# Patient Record
Sex: FEMALE | Race: WHITE
[De-identification: names, ages, dates, MRNs, and addresses within clinical notes are randomized per-mention and may not be internally consistent; named-entity substitution may affect disease eponyms.]

---

## 2019-03-19 ENCOUNTER — HOSPITAL ENCOUNTER (EMERGENCY)
Dept: HOSPITAL 14 - H.ER | Age: 24
Discharge: HOME | End: 2019-03-19
Payer: COMMERCIAL

## 2019-03-19 VITALS
HEART RATE: 64 BPM | RESPIRATION RATE: 19 BRPM | OXYGEN SATURATION: 100 % | TEMPERATURE: 98 F | DIASTOLIC BLOOD PRESSURE: 65 MMHG | SYSTOLIC BLOOD PRESSURE: 110 MMHG

## 2019-03-19 VITALS — BODY MASS INDEX: 23.5 KG/M2

## 2019-03-19 DIAGNOSIS — R42: Primary | ICD-10-CM

## 2019-03-19 LAB
ALBUMIN SERPL-MCNC: 3.8 G/DL (ref 3.5–5)
ALBUMIN/GLOB SERPL: 1.2 {RATIO} (ref 1–2.1)
ALT SERPL-CCNC: 22 U/L (ref 9–52)
AST SERPL-CCNC: 21 U/L (ref 14–36)
BASOPHILS # BLD AUTO: 0.1 K/UL (ref 0–0.2)
BASOPHILS NFR BLD: 2 % (ref 0–2)
BUN SERPL-MCNC: 9 MG/DL (ref 7–17)
CALCIUM SERPL-MCNC: 8.9 MG/DL (ref 8.4–10.2)
EOSINOPHIL # BLD AUTO: 0.2 K/UL (ref 0–0.7)
EOSINOPHIL NFR BLD: 6.7 % (ref 0–4)
ERYTHROCYTE [DISTWIDTH] IN BLOOD BY AUTOMATED COUNT: 12.9 % (ref 11.5–14.5)
GFR NON-AFRICAN AMERICAN: > 60
HGB BLD-MCNC: 12.2 G/DL (ref 12–16)
LYMPHOCYTES # BLD AUTO: 1 K/UL (ref 1–4.3)
LYMPHOCYTES NFR BLD AUTO: 28.5 % (ref 20–40)
MCH RBC QN AUTO: 30.9 PG (ref 27–31)
MCHC RBC AUTO-ENTMCNC: 33.6 G/DL (ref 33–37)
MCV RBC AUTO: 92.1 FL (ref 81–99)
MONOCYTES # BLD: 0.4 K/UL (ref 0–0.8)
MONOCYTES NFR BLD: 10.1 % (ref 0–10)
NEUTROPHILS # BLD: 1.9 K/UL (ref 1.8–7)
NEUTROPHILS NFR BLD AUTO: 52.7 % (ref 50–75)
NRBC BLD AUTO-RTO: 0.1 % (ref 0–0)
PLATELET # BLD: 185 K/UL (ref 130–400)
PMV BLD AUTO: 10 FL (ref 7.2–11.7)
RBC # BLD AUTO: 3.93 MIL/UL (ref 3.8–5.2)
WBC # BLD AUTO: 3.7 K/UL (ref 4.8–10.8)

## 2019-03-19 PROCEDURE — 81025 URINE PREGNANCY TEST: CPT

## 2019-03-19 PROCEDURE — 85025 COMPLETE CBC W/AUTO DIFF WBC: CPT

## 2019-03-19 PROCEDURE — 93005 ELECTROCARDIOGRAM TRACING: CPT

## 2019-03-19 PROCEDURE — 80053 COMPREHEN METABOLIC PANEL: CPT

## 2019-03-19 PROCEDURE — 70450 CT HEAD/BRAIN W/O DYE: CPT

## 2019-03-19 PROCEDURE — 99285 EMERGENCY DEPT VISIT HI MDM: CPT

## 2019-03-19 NOTE — CARD
--------------- APPROVED REPORT --------------





Date of service: 03/19/2019



EKG Measurement

Heart [id number]

[id number]

[id number]

[id number]

[id number]



<Conclusion>

Sinus bradycardia with sinus arrhythmia

Otherwise normal ECG

## 2019-03-19 NOTE — CT
Date of service: 03/19/2019



PROCEDURE:  CT HEAD WITHOUT CONTRAST.



HISTORY:

dizzy



COMPARISON:

Noncontrast head CT performed 7/2/14



TECHNIQUE:

Axial computed tomography images were obtained through the head/brain 

without intravenous contrast.  



Radiation dose:



Total exam DLP = 797.36 mGy-cm.



This CT exam was performed using one or more of the following dose 

reduction techniques: Automated exposure control, adjustment of the 

mA and/or kV according to patient size, and/or use of iterative 

reconstruction technique.



FINDINGS:



HEMORRHAGE:

No intracranial hemorrhage. 



BRAIN:

No mass effect or edema.  No atrophy or chronic microvascular 

ischemic changes.



VENTRICLES:

No hydrocephalus. 



CALVARIUM:

Unremarkable.



PARANASAL SINUSES:

Unremarkable as visualized. No significant inflammatory changes.



MASTOID AIR CELLS:

Unremarkable as visualized. No inflammatory changes.



OTHER FINDINGS:

Cerumen noted in the right external auditory canal.



IMPRESSION:

No acute intracranial pathology identified.

## 2019-03-19 NOTE — ED PDOC
HPI: Neurologic





- General


Time Seen by Provider: 03/19/19 09:42


Chief Complaint (Nursing): Dizziness/Lightheaded


Chief Complaint (Provider): Dizziness


Source: patient


Exam Limitations: no limitations





- History of Present Illness


Timing/Duration: other (2 to 3 weeks)


Severity: none


Associated Symptoms: nausea/vomiting


Allergies/Adverse Reactions: 


Allergies





No Known Allergies Allergy (Verified 03/19/19 09:28)


   








Home Medications: 


Ambulatory Orders





Meclizine [Antivert] 12.5 mg PO Q6H PRN #6 tab 03/19/19 








Additional Complaint(s): 


23 year old female presents to the ED for an evaluation of dizziness and nausea 

onset for 2 to 3 weeks. She states the dizziness becomes worse with movement. 

She also gets migraines regularly which resolves spontaneously. Otherwise, she 

denies fever, cough, weakness or numbness. Patient is currently on her period. 





PMD: no family provider 











Past Medical History


Reviewed: Historical Data, Nursing Documentation, Vital Signs


Vital Signs: 





                                Last Vital Signs











Temp  98.3 F   03/19/19 09:20


 


Pulse  59 L  03/19/19 09:20


 


Resp  18   03/19/19 09:20


 


BP  102/66   03/19/19 09:20


 


Pulse Ox  99   03/19/19 09:20














- Medical History


PMH: No Chronic Diseases





- Surgical History


Other surgeries: left ankle surgery





- Family History


Family History: States: Unknown Family Hx





- Social History


Current smoker - smoking cessation education provided: No


Alcohol: None


Drugs: Denies





- Home Medications


Home Medications: 


                                Ambulatory Orders











 Medication  Instructions  Recorded


 


Meclizine [Antivert] 12.5 mg PO Q6H PRN #6 tab 03/19/19














- Allergies


Allergies/Adverse Reactions: 


                                    Allergies











Allergy/AdvReac Type Severity Reaction Status Date / Time


 


No Known Allergies Allergy   Verified 03/19/19 09:28














Review of Systems


ROS Statement: Except As Marked, All Systems Reviewed And Found Negative


Constitutional: Negative for: Fever


Respiratory: Negative for: Cough


Gastrointestinal: Positive for: Nausea


Neurological: Positive for: Dizziness.  Negative for: Weakness, Numbness





Physical Exam





- Reviewed


Nursing Documentation Reviewed: Yes


Vital Signs Reviewed: Yes





- Physical Exam


Appears: Positive for: Well, Non-toxic, No Acute Distress


Head Exam: Positive for: ATRAUMATIC, NORMAL INSPECTION, NORMOCEPHALIC


Skin: Positive for: Normal Color, Warm, Dry


Eye Exam: Positive for: EOMI, Normal appearance, PERRL


ENT: Positive for: Normal ENT Inspection


Neck: Positive for: Normal, Painless ROM


Cardiovascular/Chest: Positive for: Regular Rate, Rhythm.  Negative for: Murmur


Respiratory: Positive for: Normal Breath Sounds.  Negative for: Respiratory 

Distress


Gastrointestinal/Abdominal: Positive for: Normal Exam, Soft


Back: Positive for: Normal Inspection


Extremity: Positive for: Normal ROM.  Negative for: Tenderness, Pedal Edema, 

Deformity


Neurological/Psych: Positive for: Awake, Alert, Normal Tone, Oriented (x3)





- Laboratory Results


Result Diagrams: 


                                 03/19/19 11:10





                                 03/19/19 11:10


Lab Results: 





                                        











Total Bilirubin  0.8 mg/dl (0.2-1.3)   03/19/19  11:10    


 


AST  21 U/L (14-36)   03/19/19  11:10    


 


ALT  22 U/L (9-52)   03/19/19  11:10    


 


Alkaline Phosphatase  39 U/L ()   03/19/19  11:10    


 


Total Protein  7.0 G/DL (6.3-8.2)   03/19/19  11:10    


 


Albumin  3.8 g/dL (3.5-5.0)   03/19/19  11:10    


 


Globulin  3.2 gm/dL (2.2-3.9)   03/19/19  11:10    


 


Albumin/Globulin Ratio  1.2  (1.0-2.1)   03/19/19  11:10    














- ECG


ECG Rhythm: Positive for: Sinus Rhythm, Sinus Bradycardia


Rate: 52


O2 Sat by Pulse Oximetry: 99 (RA)


Pulse Ox Interpretation: Normal





Medical Decision Making


Medical Decision Making: 


Time: 1058


Plan:  


Head w/o contrast CT


CMP


CBC w/ differential 


Antivert  25mg 


Normal saline 999 mls/hr 


Zofran Inj 4mg 


Reevaluation 





1213


PROCEDURE:  CT HEAD WITHOUT CONTRAST.


HISTORY:


dizzy


COMPARISON:


Noncontrast head CT performed 7/2/14


TECHNIQUE:


Axial computed tomography images were obtained through the head/brain without 

intravenous contrast.  


Radiation dose:


Total exam DLP = 797.36 mGy-cm.


This CT exam was performed using one or more of the following dose reduction 

techniques: Automated exposure control, adjustment of the mA and/or kV according

 to patient size, and/or use of iterative reconstruction technique.


FINDINGS:


HEMORRHAGE:


No intracranial hemorrhage. 


BRAIN:


No mass effect or edema.  No atrophy or chronic microvascular ischemic changes.


VENTRICLES:


No hydrocephalus. 


CALVARIUM:


Unremarkable.


PARANASAL SINUSES:


Unremarkable as visualized. No significant inflammatory changes.


MASTOID AIR CELLS:


Unremarkable as visualized. No inflammatory changes.


OTHER FINDINGS:


Cerumen noted in the right external auditory canal.


IMPRESSION:


No acute intracranial pathology identified.








1345


Patient states she is always bradycardic for as long as she can remember. 

Patient is feeling better and will be discharged home with Meclizine. Patient 

needs to follow up with at the [facility] 


------------------------------

-------------------------------------------------------


Scribe Attestation:


Documented by Pablo Evans, acting as a scribe forJudith Sanderson MD. 





Provider Scribe Attestation:


All medical record entries made by the Scribe were at my direction and 

personally dictated by me. I have reviewed the chart and agree that the record 

accurately reflects my personal performance of the history, physical exam, 

medical decision making, and the department course for this patient. I have also

 personally directed, reviewed, and agree with the discharge instructions and 

disposition.











Disposition





- Clinical Impression


Clinical Impression: 


 Dizziness, Vertigo








- Patient ED Disposition


Is Patient to be Admitted: No





- Disposition


Referrals: 


[facility] [Outside]


FAMILY PROVIDER,NO [Primary Care Provider] - 


Disposition: Routine/Home


Disposition Time: 13:45


Condition: IMPROVED


Additional Instructions: 


follow up with the clinic in 2 days for reevaluation


return to the ED with any worsening or concerning symptoms


Prescriptions: 


Meclizine [Antivert] 12.5 mg PO Q6H PRN #6 tab


 PRN Reason: Dizziness


Instructions:  Dizziness, Nonvertigo, (DC)


Forms:  [facility] (English)